# Patient Record
Sex: FEMALE | Race: WHITE | NOT HISPANIC OR LATINO | URBAN - METROPOLITAN AREA
[De-identification: names, ages, dates, MRNs, and addresses within clinical notes are randomized per-mention and may not be internally consistent; named-entity substitution may affect disease eponyms.]

---

## 2019-04-22 ENCOUNTER — OUTPATIENT (OUTPATIENT)
Dept: OUTPATIENT SERVICES | Facility: HOSPITAL | Age: 79
LOS: 1 days | End: 2019-04-22
Payer: MEDICARE

## 2019-04-22 VITALS
WEIGHT: 143.08 LBS | OXYGEN SATURATION: 98 % | DIASTOLIC BLOOD PRESSURE: 71 MMHG | SYSTOLIC BLOOD PRESSURE: 128 MMHG | RESPIRATION RATE: 14 BRPM | HEIGHT: 63 IN | TEMPERATURE: 98 F | HEART RATE: 70 BPM

## 2019-04-22 DIAGNOSIS — I80.00 PHLEBITIS AND THROMBOPHLEBITIS OF SUPERFICIAL VESSELS OF UNSPECIFIED LOWER EXTREMITY: ICD-10-CM

## 2019-04-22 DIAGNOSIS — Z01.818 ENCOUNTER FOR OTHER PREPROCEDURAL EXAMINATION: ICD-10-CM

## 2019-04-22 DIAGNOSIS — Z90.49 ACQUIRED ABSENCE OF OTHER SPECIFIED PARTS OF DIGESTIVE TRACT: Chronic | ICD-10-CM

## 2019-04-22 DIAGNOSIS — Z95.828 PRESENCE OF OTHER VASCULAR IMPLANTS AND GRAFTS: Chronic | ICD-10-CM

## 2019-04-22 DIAGNOSIS — Z98.890 OTHER SPECIFIED POSTPROCEDURAL STATES: Chronic | ICD-10-CM

## 2019-04-22 DIAGNOSIS — Z96.649 PRESENCE OF UNSPECIFIED ARTIFICIAL HIP JOINT: Chronic | ICD-10-CM

## 2019-04-22 DIAGNOSIS — S62.607P: ICD-10-CM

## 2019-04-22 LAB
ALBUMIN SERPL ELPH-MCNC: 3.8 G/DL — SIGNIFICANT CHANGE UP (ref 3.3–5)
ALP SERPL-CCNC: 85 U/L — SIGNIFICANT CHANGE UP (ref 40–120)
ALT FLD-CCNC: 21 U/L — SIGNIFICANT CHANGE UP (ref 12–78)
ANION GAP SERPL CALC-SCNC: 6 MMOL/L — SIGNIFICANT CHANGE UP (ref 5–17)
AST SERPL-CCNC: 13 U/L — LOW (ref 15–37)
BILIRUB SERPL-MCNC: 0.3 MG/DL — SIGNIFICANT CHANGE UP (ref 0.2–1.2)
BUN SERPL-MCNC: 30 MG/DL — HIGH (ref 7–23)
CALCIUM SERPL-MCNC: 9.2 MG/DL — SIGNIFICANT CHANGE UP (ref 8.5–10.1)
CHLORIDE SERPL-SCNC: 109 MMOL/L — HIGH (ref 96–108)
CO2 SERPL-SCNC: 29 MMOL/L — SIGNIFICANT CHANGE UP (ref 22–31)
CREAT SERPL-MCNC: 0.99 MG/DL — SIGNIFICANT CHANGE UP (ref 0.5–1.3)
GLUCOSE SERPL-MCNC: 87 MG/DL — SIGNIFICANT CHANGE UP (ref 70–99)
HCT VFR BLD CALC: 37.6 % — SIGNIFICANT CHANGE UP (ref 34.5–45)
HGB BLD-MCNC: 11.9 G/DL — SIGNIFICANT CHANGE UP (ref 11.5–15.5)
MCHC RBC-ENTMCNC: 30 PG — SIGNIFICANT CHANGE UP (ref 27–34)
MCHC RBC-ENTMCNC: 31.6 GM/DL — LOW (ref 32–36)
MCV RBC AUTO: 94.7 FL — SIGNIFICANT CHANGE UP (ref 80–100)
NRBC # BLD: 0 /100 WBCS — SIGNIFICANT CHANGE UP (ref 0–0)
PLATELET # BLD AUTO: 210 K/UL — SIGNIFICANT CHANGE UP (ref 150–400)
POTASSIUM SERPL-MCNC: 4.6 MMOL/L — SIGNIFICANT CHANGE UP (ref 3.5–5.3)
POTASSIUM SERPL-SCNC: 4.6 MMOL/L — SIGNIFICANT CHANGE UP (ref 3.5–5.3)
PROT SERPL-MCNC: 6.5 G/DL — SIGNIFICANT CHANGE UP (ref 6–8.3)
RBC # BLD: 3.97 M/UL — SIGNIFICANT CHANGE UP (ref 3.8–5.2)
RBC # FLD: 12.5 % — SIGNIFICANT CHANGE UP (ref 10.3–14.5)
SODIUM SERPL-SCNC: 144 MMOL/L — SIGNIFICANT CHANGE UP (ref 135–145)
WBC # BLD: 5.91 K/UL — SIGNIFICANT CHANGE UP (ref 3.8–10.5)
WBC # FLD AUTO: 5.91 K/UL — SIGNIFICANT CHANGE UP (ref 3.8–10.5)

## 2019-04-22 PROCEDURE — 86900 BLOOD TYPING SEROLOGIC ABO: CPT

## 2019-04-22 PROCEDURE — 36415 COLL VENOUS BLD VENIPUNCTURE: CPT

## 2019-04-22 PROCEDURE — G0463: CPT

## 2019-04-22 PROCEDURE — 93010 ELECTROCARDIOGRAM REPORT: CPT | Mod: NC

## 2019-04-22 PROCEDURE — 85027 COMPLETE CBC AUTOMATED: CPT

## 2019-04-22 PROCEDURE — 80053 COMPREHEN METABOLIC PANEL: CPT

## 2019-04-22 PROCEDURE — 93005 ELECTROCARDIOGRAM TRACING: CPT

## 2019-04-22 PROCEDURE — 86850 RBC ANTIBODY SCREEN: CPT

## 2019-04-22 PROCEDURE — 86901 BLOOD TYPING SEROLOGIC RH(D): CPT

## 2019-04-22 NOTE — H&P PST ADULT - HISTORY OF PRESENT ILLNESS
78 year old female PMH Irregular Rhythm, Palpitations, Lyme Disease, Arthritis presents for PST prior to Closed Reduction Percutaneous Pinning of the LEFT Small Finger/Possible Open Reduction Internal Fixation with Application of Splint with Dr Brower on 4/24/19 - Pt notes " I Thought I broke my LEFT Hand on Wed April 17th - and I went to the ER at [facility] and X-rays and the doctor did some manipulation as my pinky finger was just sticking out." Pt notes "then he bandged me up and sent me to Dr Coy." Pt notes she was seen in the office Friday 4/19/19 and more Xrays were obtained and FX of LEFT little finger was diagnosed - pt notes "she wrapped/put me in a splint and we discussed surgery to repair fracture." Pt notes "worst pain is at night rates it #8/10 on pain scale - using Oxycodone for pain as needed with some relief noted - following discussions with Dr Coy pt is electing for scheduled procedure. 78 year old female PMH Irregular Rhythm, Palpitations, Lyme Disease, Arthritis presents for PST prior to Closed Reduction Percutaneous Pinning of the LEFT Small Finger/Possible Open Reduction Internal Fixation with Application of Splint with Dr Brower on 4/24/19 - Pt notes " I Thought I broke my LEFT Hand on Wed April 17th -I was home baking and someone knocked on my door - I turned around to rush to the door slammed my finger into the wall while rushing." Pt states she  went to the ER at [facility] and had  X-rays - notes the doctor did some manipulation as my pinky finger was just sticking out." Pt notes "then he bandged me up and sent me to Dr Coy." Pt notes she was seen in the office Friday 4/19/19 and more Xrays were obtained and FX of LEFT little finger was diagnosed - pt notes "she wrapped/put me in a splint and we discussed surgery to repair fracture." Pt notes "worst pain is at night rates it #8/10 on pain scale - using Oxycodone for pain as needed with some relief noted - following discussions with Dr Coy pt is electing for scheduled procedure.

## 2019-04-22 NOTE — H&P PST ADULT - LAST STRESS TEST
Stress Test - September 2018 - Dr Massey - Cardiologist Stress Test - September 2018 - Dr Massey - Cardiologist- Sees Dr Richard (Placement and management of Loop Recorder- Placed Sept 2018)

## 2019-04-22 NOTE — H&P PST ADULT - NSICDXPASTMEDICALHX_GEN_ALL_CORE_FT
PAST MEDICAL HISTORY:  Anxiety and depression     Arthritis     Back pain     DVT of leg (deep venous thrombosis)     Fracture of unspecified phalanx of left little finger, subsequent encounter for fracture with malunion     H/O degenerative disc disease     H/O dizziness     H/O heart artery stent LAD - September 2018    H/O heartburn     H/o Lyme disease     H/O nausea     H/O weakness     Headache     History of fainting spells of unknown cause     History of palpitations     Irregular heart rhythm     Morning headache     Muscle cramps     Occasional tremors Notes on RIGHT Side Since having Lyme Disease

## 2019-04-22 NOTE — H&P PST ADULT - ASSESSMENT
78 year old female with Fracture of Unspecified Phalanx of LEFT little Finger, Subsequent Encounter for fracture with Malunion with Dr Gissell Coy on 4/24/19

## 2019-04-22 NOTE — H&P PST ADULT - MUSCULOSKELETAL COMMENTS
LEFT Hand in Ace Wrap with Splint - fingers warm and mobile SEE HPI - LEFT Hand -Pinky in Splint with Ace Wrap- fingers mobile

## 2019-04-22 NOTE — H&P PST ADULT - NSANTHOSAYNRD_GEN_A_CORE
No. PREET screening performed.  STOP BANG Legend: 0-2 = LOW Risk; 3-4 = INTERMEDIATE Risk; 5-8 = HIGH Risk

## 2019-04-22 NOTE — H&P PST ADULT - NSICDXFAMILYHX_GEN_ALL_CORE_FT
FAMILY HISTORY:  Family history of stroke, Father -  age 49 - Pt notes Mother Still alive and well age 105

## 2019-04-22 NOTE — H&P PST ADULT - NSICDXPASTSURGICALHX_GEN_ALL_CORE_FT
PAST SURGICAL HISTORY:  [place] filter in place     H/O colonoscopy     S/P appendectomy Also had RIGHT Ovary Removed at this time    S/P cholecystectomy     S/P hip replacement Bilateral    S/P partial colectomy

## 2019-04-22 NOTE — H&P PST ADULT - NSICDXPROBLEM_GEN_ALL_CORE_FT
PROBLEM DIAGNOSES  Problem: Fracture of unspecified phalanx of left little finger, subsequent encounter for fracture with malunion  Assessment and Plan: PST Labs; CBC< CMP, Type & Screen, EKG - Medical Clearance appointment scheduled with Dr Murrell today at 3 Pm - pt to make cardiology clearance appointment PROBLEM DIAGNOSES  Problem: Fracture of unspecified phalanx of left little finger, subsequent encounter for fracture with malunion    Assessment and Plan: PST Labs; CBC, CMP, Type & Screen, EKG - Medical Clearance appointment scheduled with Dr Yoder today at 3 Pm - pt to make cardiology clearance appointment (Attempted several times to call cardiology Office however phone lines down at office - pt to call when she arrives home to get appointment as procedure is on Wed 4/24/19)    Pt instructed to stop Multivitamin as well as any NSAIDS/herbal Supplements between now and procedure - she may take Tylenol as needed for pain  - she was instructed by Dr Coy to Continue Both her Baby ASA as well as her Plavix secondary to newly placed ANTOINE (September 2018) - I spoke with Anesthesia (Dr Medrano) regarding this and he was ok with pt remaining on both Plavix and ASA as well at this time -  Pt instructed to take Baby ASA, Plavix, Lipitor, Pindolol and Effexor with small sip of water morning of procedure    Pre-Procedure instructions as well as pre-op wash instructions (one surgical scrub only - due to Splint/Ace Wrap ) given to pt with understanding verbalized    Pt verbalizes understanding of all instructions and that she will need cardiac clearance prior to procedure - PROBLEM DIAGNOSES  Problem: Fracture of unspecified phalanx of left little finger, subsequent encounter for fracture with malunion    Assessment and Plan: PST Labs; CBC, CMP, Type & Screen, EKG - Medical Clearance appointment scheduled with Dr Yoder today at 3 Pm - pt to make cardiology clearance appointment (Attempted several times to call cardiology Office however phone lines down at office - pt to call when she arrives home to get appointment as procedure is on Wed 4/24/19)- I spoke with cardiology office pt has appointment scheduled for tomorrow 4/23/19 @ 10AM (Dr Massey)- all PST's faxed to offices    Pt instructed to stop Multivitamin as well as any NSAIDS/herbal Supplements between now and procedure - she may take Tylenol as needed for pain  - she was instructed by Dr Coy to Continue Both her Baby ASA as well as her Plavix secondary to newly placed ANTOINE (September 2018) - I spoke with Anesthesia (Dr Medrano) regarding this and he was ok with pt remaining on both Plavix and ASA as well at this time -  Pt instructed to take Baby ASA, Plavix, Lipitor, Pindolol and Effexor with small sip of water morning of procedure    Pre-Procedure instructions as well as pre-op wash instructions (one surgical scrub only - due to Splint/Ace Wrap ) given to pt with understanding verbalized    Pt verbalizes understanding of all instructions and that she will need cardiac clearance prior to procedure -

## 2019-04-22 NOTE — H&P PST ADULT - ENMT COMMENTS
No fainting spells since been on Pindolol - also LOOP recorded in place Sept 2018 Patient claims - "No fainting spells since been on Pindolol" - also notes LOOP recorded in place Sept 2018

## 2019-04-23 ENCOUNTER — TRANSCRIPTION ENCOUNTER (OUTPATIENT)
Age: 79
End: 2019-04-23

## 2019-04-23 PROBLEM — Z95.5 PRESENCE OF CORONARY ANGIOPLASTY IMPLANT AND GRAFT: Chronic | Status: ACTIVE | Noted: 2019-04-22

## 2019-04-23 PROBLEM — R25.1 TREMOR, UNSPECIFIED: Chronic | Status: ACTIVE | Noted: 2019-04-22

## 2019-04-24 ENCOUNTER — OUTPATIENT (OUTPATIENT)
Dept: OUTPATIENT SERVICES | Facility: HOSPITAL | Age: 79
LOS: 1 days | End: 2019-04-24
Payer: MEDICARE

## 2019-04-24 VITALS
DIASTOLIC BLOOD PRESSURE: 76 MMHG | RESPIRATION RATE: 14 BRPM | OXYGEN SATURATION: 98 % | SYSTOLIC BLOOD PRESSURE: 159 MMHG | HEART RATE: 74 BPM | TEMPERATURE: 98 F

## 2019-04-24 VITALS
TEMPERATURE: 98 F | WEIGHT: 143.08 LBS | HEIGHT: 63 IN | SYSTOLIC BLOOD PRESSURE: 134 MMHG | HEART RATE: 73 BPM | DIASTOLIC BLOOD PRESSURE: 82 MMHG | RESPIRATION RATE: 16 BRPM | OXYGEN SATURATION: 97 %

## 2019-04-24 DIAGNOSIS — Z98.890 OTHER SPECIFIED POSTPROCEDURAL STATES: Chronic | ICD-10-CM

## 2019-04-24 DIAGNOSIS — Z90.49 ACQUIRED ABSENCE OF OTHER SPECIFIED PARTS OF DIGESTIVE TRACT: Chronic | ICD-10-CM

## 2019-04-24 DIAGNOSIS — Z96.649 PRESENCE OF UNSPECIFIED ARTIFICIAL HIP JOINT: Chronic | ICD-10-CM

## 2019-04-24 DIAGNOSIS — Z95.828 PRESENCE OF OTHER VASCULAR IMPLANTS AND GRAFTS: Chronic | ICD-10-CM

## 2019-04-24 DIAGNOSIS — S62.607P: ICD-10-CM

## 2019-04-24 PROCEDURE — C1713: CPT

## 2019-04-24 PROCEDURE — 26727 TREAT FINGER FRACTURE EACH: CPT | Mod: F4

## 2019-04-24 PROCEDURE — 76000 FLUOROSCOPY <1 HR PHYS/QHP: CPT

## 2019-04-24 RX ORDER — CEPHALEXIN 500 MG
1 CAPSULE ORAL
Qty: 10 | Refills: 0 | OUTPATIENT
Start: 2019-04-24 | End: 2019-04-28

## 2019-04-24 RX ORDER — ACETAMINOPHEN 500 MG
2 TABLET ORAL
Qty: 0 | Refills: 0 | COMMUNITY

## 2019-04-24 RX ORDER — VENLAFAXINE HCL 75 MG
300 CAPSULE, EXT RELEASE 24 HR ORAL
Qty: 0 | Refills: 0 | COMMUNITY

## 2019-04-24 RX ORDER — SODIUM CHLORIDE 9 MG/ML
1000 INJECTION, SOLUTION INTRAVENOUS
Qty: 0 | Refills: 0 | Status: DISCONTINUED | OUTPATIENT
Start: 2019-04-24 | End: 2019-04-24

## 2019-04-24 RX ORDER — PINDOLOL 10 MG
7.5 TABLET ORAL
Qty: 0 | Refills: 0 | COMMUNITY

## 2019-04-24 RX ORDER — ASCORBIC ACID 60 MG
1 TABLET,CHEWABLE ORAL
Qty: 0 | Refills: 0 | COMMUNITY

## 2019-04-24 RX ORDER — CLONAZEPAM 1 MG
1 TABLET ORAL
Qty: 0 | Refills: 0 | COMMUNITY

## 2019-04-24 RX ORDER — BUTALBITAL/ACETAMINOPHEN 50MG-650MG
1 TABLET ORAL
Qty: 0 | Refills: 0 | COMMUNITY

## 2019-04-24 RX ORDER — ONDANSETRON 8 MG/1
4 TABLET, FILM COATED ORAL ONCE
Qty: 0 | Refills: 0 | Status: DISCONTINUED | OUTPATIENT
Start: 2019-04-24 | End: 2019-04-24

## 2019-04-24 RX ORDER — CLONAZEPAM 1 MG
1 TABLET ORAL
Qty: 0 | Refills: 0 | COMMUNITY
Start: 2019-04-24

## 2019-04-24 RX ORDER — FENTANYL CITRATE 50 UG/ML
25 INJECTION INTRAVENOUS
Qty: 0 | Refills: 0 | Status: DISCONTINUED | OUTPATIENT
Start: 2019-04-24 | End: 2019-04-24

## 2019-04-24 RX ORDER — ATORVASTATIN CALCIUM 80 MG/1
1 TABLET, FILM COATED ORAL
Qty: 0 | Refills: 0 | COMMUNITY

## 2019-04-24 RX ORDER — CLOPIDOGREL BISULFATE 75 MG/1
1 TABLET, FILM COATED ORAL
Qty: 0 | Refills: 0 | COMMUNITY

## 2019-04-24 RX ORDER — ASPIRIN/CALCIUM CARB/MAGNESIUM 324 MG
343 TABLET ORAL
Qty: 0 | Refills: 0 | COMMUNITY

## 2019-04-24 RX ORDER — CEFAZOLIN SODIUM 1 G
2000 VIAL (EA) INJECTION ONCE
Qty: 0 | Refills: 0 | Status: COMPLETED | OUTPATIENT
Start: 2019-04-24 | End: 2019-04-24

## 2019-04-24 RX ADMIN — SODIUM CHLORIDE 40 MILLILITER(S): 9 INJECTION, SOLUTION INTRAVENOUS at 13:14

## 2019-04-24 NOTE — ASU PATIENT PROFILE, ADULT - PATIENT'S HEIGHT AND WEIGHT RECORDED IN THE VITAL SIGNS FLOWSHEET
yes Trilobed Flap Text: The defect edges were debeveled with a #15 scalpel blade.  Given the location of the defect and the proximity to free margins a trilobed flap was deemed most appropriate.  Using a sterile surgical marker, an appropriate trilobed flap drawn around the defect.    The area thus outlined was incised deep to adipose tissue with a #15 scalpel blade.  The skin margins were undermined to an appropriate distance in all directions utilizing iris scissors.

## 2019-04-24 NOTE — ASU PATIENT PROFILE, ADULT - PMH
Anxiety and depression    Arthritis    Back pain    DVT of leg (deep venous thrombosis)    Fracture of unspecified phalanx of left little finger, subsequent encounter for fracture with malunion    H/O degenerative disc disease    H/O dizziness    H/O heart artery stent  LAD - September 2018  H/O heartburn    H/o Lyme disease    H/O nausea    H/O weakness    Headache    History of fainting spells of unknown cause    History of palpitations    Irregular heart rhythm    Morning headache    Muscle cramps    Occasional tremors  Notes on RIGHT Side Since having Lyme Disease

## 2019-04-24 NOTE — ASU DISCHARGE PLAN (ADULT/PEDIATRIC) - CALL YOUR DOCTOR IF YOU HAVE ANY OF THE FOLLOWING:
Wound/Surgical Site with redness, or foul smelling discharge or pus/Pain not relieved by Medications Bleeding that does not stop/Numbness, tingling, color or temperature change to extremity/Pain not relieved by Medications/Wound/Surgical Site with redness, or foul smelling discharge or pus

## 2019-04-24 NOTE — ASU DISCHARGE PLAN (ADULT/PEDIATRIC) - CARE PROVIDER_API CALL
Gissell Coy)  Plastic Surgery; Surgery of the Hand  [address] [place] [address]  Phone: [phone]  Fax: [phone]  Follow Up Time:

## 2019-04-24 NOTE — ASU PATIENT PROFILE, ADULT - PSH
[place] filter in place    H/O colonoscopy    S/P appendectomy  Also had RIGHT Ovary Removed at this time  S/P cholecystectomy    S/P hip replacement  Bilateral  S/P partial colectomy

## 2019-04-24 NOTE — ASU DISCHARGE PLAN (ADULT/PEDIATRIC) - ASU DC SPECIAL INSTRUCTIONSFT
Non weight bearing with left hand  Keep splint clean dry and intact until seen in office  Elevate above heart to help reduce swelling  Patient has pain medication at home, none were sent from the EMR here  Follow up in office in 1-2 weeks   call office for appointment

## 2020-02-16 NOTE — H&P PST ADULT - POSTERIOR CERVICAL R
Referring Provider:       Trudy Sorenson MD     Additional Provider:     DO Alexy Carpenter MD    Reason for Referral:  Tuan Diaz had genetic testing performed on 1/31/2020 because she is adopted. normal

## 2021-04-08 ENCOUNTER — NON-APPOINTMENT (OUTPATIENT)
Age: 81
End: 2021-04-08

## 2021-04-08 DIAGNOSIS — Z86.59 PERSONAL HISTORY OF OTHER MENTAL AND BEHAVIORAL DISORDERS: ICD-10-CM

## 2021-04-08 DIAGNOSIS — J44.9 CHRONIC OBSTRUCTIVE PULMONARY DISEASE, UNSPECIFIED: ICD-10-CM

## 2021-04-08 DIAGNOSIS — Z78.9 OTHER SPECIFIED HEALTH STATUS: ICD-10-CM

## 2021-04-08 DIAGNOSIS — G43.909 MIGRAINE, UNSPECIFIED, NOT INTRACTABLE, W/OUT STATUS MIGRAINOSUS: ICD-10-CM

## 2021-04-08 DIAGNOSIS — E78.5 HYPERLIPIDEMIA, UNSPECIFIED: ICD-10-CM

## 2022-08-09 PROBLEM — Z87.898 PERSONAL HISTORY OF OTHER SPECIFIED CONDITIONS: Chronic | Status: ACTIVE | Noted: 2019-04-22

## 2022-08-09 PROBLEM — I82.409 ACUTE EMBOLISM AND THROMBOSIS OF UNSPECIFIED DEEP VEINS OF UNSPECIFIED LOWER EXTREMITY: Chronic | Status: ACTIVE | Noted: 2019-04-22

## 2022-08-09 PROBLEM — Z86.19 PERSONAL HISTORY OF OTHER INFECTIOUS AND PARASITIC DISEASES: Chronic | Status: ACTIVE | Noted: 2019-04-22

## 2022-08-09 PROBLEM — M19.90 UNSPECIFIED OSTEOARTHRITIS, UNSPECIFIED SITE: Chronic | Status: ACTIVE | Noted: 2019-04-22

## 2022-08-09 PROBLEM — R51 HEADACHE: Chronic | Status: ACTIVE | Noted: 2019-04-22

## 2022-08-09 PROBLEM — M54.9 DORSALGIA, UNSPECIFIED: Chronic | Status: ACTIVE | Noted: 2019-04-22

## 2022-08-09 PROBLEM — I49.9 CARDIAC ARRHYTHMIA, UNSPECIFIED: Chronic | Status: ACTIVE | Noted: 2019-04-22

## 2022-08-09 PROBLEM — R25.2 CRAMP AND SPASM: Chronic | Status: ACTIVE | Noted: 2019-04-22

## 2022-08-09 PROBLEM — Z87.39 PERSONAL HISTORY OF OTHER DISEASES OF THE MUSCULOSKELETAL SYSTEM AND CONNECTIVE TISSUE: Chronic | Status: ACTIVE | Noted: 2019-04-22

## 2022-08-09 PROBLEM — Z91.89 OTHER SPECIFIED PERSONAL RISK FACTORS, NOT ELSEWHERE CLASSIFIED: Chronic | Status: ACTIVE | Noted: 2019-04-22

## 2022-08-09 PROBLEM — F41.9 ANXIETY DISORDER, UNSPECIFIED: Chronic | Status: ACTIVE | Noted: 2019-04-22

## 2022-08-09 PROBLEM — S62.607P: Chronic | Status: ACTIVE | Noted: 2019-04-22

## 2022-08-29 ENCOUNTER — APPOINTMENT (OUTPATIENT)
Dept: GASTROENTEROLOGY | Facility: CLINIC | Age: 82
End: 2022-08-29

## 2022-08-29 VITALS
OXYGEN SATURATION: 97 % | DIASTOLIC BLOOD PRESSURE: 60 MMHG | SYSTOLIC BLOOD PRESSURE: 110 MMHG | HEIGHT: 62 IN | TEMPERATURE: 96.5 F | WEIGHT: 140.25 LBS | BODY MASS INDEX: 25.81 KG/M2 | HEART RATE: 76 BPM

## 2022-08-29 DIAGNOSIS — K38.8 OTHER SPECIFIED DISEASES OF APPENDIX: ICD-10-CM

## 2022-08-29 DIAGNOSIS — K59.1 FUNCTIONAL DIARRHEA: ICD-10-CM

## 2022-08-29 DIAGNOSIS — Z87.19 PERSONAL HISTORY OF OTHER DISEASES OF THE DIGESTIVE SYSTEM: ICD-10-CM

## 2022-08-29 DIAGNOSIS — K35.32 ACUTE APPENDICITIS W/ PERFORATION AND LOCALIZED PERITONITIS, W/O ABSCESS: ICD-10-CM

## 2022-08-29 DIAGNOSIS — Z82.3 FAMILY HISTORY OF STROKE: ICD-10-CM

## 2022-08-29 DIAGNOSIS — Z86.718 PERSONAL HISTORY OF OTHER VENOUS THROMBOSIS AND EMBOLISM: ICD-10-CM

## 2022-08-29 DIAGNOSIS — I25.10 ATHEROSCLEROTIC HEART DISEASE OF NATIVE CORONARY ARTERY W/OUT ANGINA PECTORIS: ICD-10-CM

## 2022-08-29 DIAGNOSIS — I10 ESSENTIAL (PRIMARY) HYPERTENSION: ICD-10-CM

## 2022-08-29 PROCEDURE — 99214 OFFICE O/P EST MOD 30 MIN: CPT

## 2022-08-29 RX ORDER — BUTALBITAL, ACETAMINOPHEN AND CAFFEINE 325; 50; 40 MG/1; MG/1; MG/1
50-325-40 TABLET ORAL
Qty: 60 | Refills: 0 | Status: ACTIVE | COMMUNITY
Start: 2022-06-02

## 2022-08-29 RX ORDER — PINDOLOL 5 MG/1
5 TABLET ORAL
Qty: 180 | Refills: 0 | Status: ACTIVE | COMMUNITY
Start: 2022-04-05

## 2022-08-29 RX ORDER — DESVENLAFAXINE 100 MG/1
100 TABLET, EXTENDED RELEASE ORAL
Qty: 30 | Refills: 0 | Status: ACTIVE | COMMUNITY
Start: 2022-03-30

## 2022-08-29 RX ORDER — UBROGEPANT 100 MG/1
100 TABLET ORAL
Qty: 10 | Refills: 0 | Status: ACTIVE | COMMUNITY
Start: 2022-03-02

## 2022-08-29 RX ORDER — BUSPIRONE HYDROCHLORIDE 30 MG/1
30 TABLET ORAL
Qty: 60 | Refills: 0 | Status: ACTIVE | COMMUNITY
Start: 2022-04-12

## 2022-08-29 RX ORDER — DESVENLAFAXINE 50 MG/1
50 TABLET, EXTENDED RELEASE ORAL
Qty: 30 | Refills: 0 | Status: ACTIVE | COMMUNITY
Start: 2022-04-12

## 2022-08-29 RX ORDER — APIXABAN 5 MG/1
5 TABLET, FILM COATED ORAL
Qty: 180 | Refills: 0 | Status: ACTIVE | COMMUNITY
Start: 2022-03-01

## 2022-08-29 RX ORDER — MIDODRINE HYDROCHLORIDE 5 MG/1
5 TABLET ORAL
Qty: 90 | Refills: 0 | Status: ACTIVE | COMMUNITY
Start: 2022-04-05

## 2022-08-29 RX ORDER — AZITHROMYCIN 250 MG/1
250 TABLET, FILM COATED ORAL
Qty: 6 | Refills: 0 | Status: ACTIVE | COMMUNITY
Start: 2022-05-30

## 2022-08-29 RX ORDER — COVID-19 ANTIGEN TEST
KIT MISCELLANEOUS
Qty: 8 | Refills: 0 | Status: ACTIVE | COMMUNITY
Start: 2022-03-23

## 2022-08-29 RX ORDER — CLONAZEPAM 0.5 MG/1
0.5 TABLET ORAL
Qty: 90 | Refills: 0 | Status: ACTIVE | COMMUNITY
Start: 2022-06-14

## 2022-08-29 RX ORDER — BUTALBITAL, ACETAMINOPHEN AND CAFFEINE 300; 50; 40 MG/1; MG/1; MG/1
50-300-40 CAPSULE ORAL
Qty: 30 | Refills: 0 | Status: ACTIVE | COMMUNITY
Start: 2021-12-10

## 2022-08-29 RX ORDER — TOPIRAMATE 25 MG/1
25 TABLET, FILM COATED ORAL
Qty: 120 | Refills: 0 | Status: ACTIVE | COMMUNITY
Start: 2022-06-28

## 2022-08-29 RX ORDER — OLANZAPINE 2.5 MG/1
2.5 TABLET, FILM COATED ORAL
Qty: 90 | Refills: 0 | Status: ACTIVE | COMMUNITY
Start: 2022-08-23

## 2022-08-29 NOTE — ASSESSMENT
[FreeTextEntry1] : Impression: Exacerbation of GERD off PPI.  History of colon polyps due for follow-up colonoscopy 2024.  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  No evidence of pseudotumor peritonei since that time.  Status post sigmoid resection for diverticulitis.  Chronic diarrhea stable.  Status post DVT on Eliquis.\par \par Plan: Begin pantoprazole 40 mg p.o. daily for 8 weeks then as needed.  Recall for colonoscopy 2024.

## 2022-08-29 NOTE — HISTORY OF PRESENT ILLNESS
[Heartburn] : heartburn worsened [Nausea] : nausea worsened [Vomiting] : vomiting worsened [Diarrhea] : stable diarrhea [Constipation] : denies constipation [Yellow Skin Or Eyes (Jaundice)] : denies jaundice [Abdominal Pain] : abdominal pain worsened [Abdominal Swelling] : denies abdominal swelling [Rectal Pain] : denies rectal pain [GERD] : gastroesophageal reflux disease [Hiatus Hernia] : no hiatus hernia [Peptic Ulcer Disease] : no peptic ulcer disease [Pancreatitis] : no pancreatitis [Cholelithiasis] : cholelithiasis [Kidney Stone] : no kidney stone [Inflammatory Bowel Disease] : no inflammatory bowel disease [Irritable Bowel Syndrome] : irritable bowel syndrome [Diverticulitis] : diverticulitis [Alcohol Abuse] : no alcohol abuse [Abdominal Surgery] : abdominal surgery [Appendectomy] : appendectomy [Cholecystectomy] : cholecystectomy [_________] : Performed [unfilled] [de-identified] : 82-year-old female history of coronary artery disease and cardiac stents and DVT currently on Eliquis, 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  Denies dysphagia.  Last EGD and colonoscopy while hospitalized in 2019 for exacerbation of diarrhea.  EGD was normal including random biopsies.  Colonoscopy was normal with the exception of postsurgical changes including random biopsies.  Secretory diarrhea work-up negative at that time.  Patient has no lower GI complaints currently.  On PPI in the past but has not taken in some time. [de-identified] : Normal [de-identified] : Status post right and sigmoid colon resections.  Random biopsies normal.  5-year recall.

## 2022-08-29 NOTE — PHYSICAL EXAM
[General Appearance - Alert] : alert [General Appearance - In No Acute Distress] : in no acute distress [Auscultation Breath Sounds / Voice Sounds] : lungs were clear to auscultation bilaterally [Heart Rate And Rhythm] : heart rate was normal and rhythm regular [Heart Sounds] : normal S1 and S2 [Heart Sounds Gallop] : no gallops [Murmurs] : no murmurs [Heart Sounds Pericardial Friction Rub] : no pericardial rub [Full Pulse] : the pedal pulses are present [Edema] : there was no peripheral edema [Bowel Sounds] : normal bowel sounds [Abdomen Soft] : soft [Abdomen Tenderness] : non-tender [] : no hepato-splenomegaly [Abdomen Mass (___ Cm)] : no abdominal mass palpated [FreeTextEntry1] : No healed surgical scars

## 2022-12-07 ENCOUNTER — NON-APPOINTMENT (OUTPATIENT)
Age: 82
End: 2022-12-07

## 2023-04-26 ENCOUNTER — RX RENEWAL (OUTPATIENT)
Age: 83
End: 2023-04-26

## 2023-09-05 ENCOUNTER — APPOINTMENT (OUTPATIENT)
Dept: OTOLARYNGOLOGY | Facility: CLINIC | Age: 83
End: 2023-09-05
Payer: MEDICARE

## 2023-09-05 VITALS
HEART RATE: 79 BPM | BODY MASS INDEX: 27.6 KG/M2 | DIASTOLIC BLOOD PRESSURE: 81 MMHG | HEIGHT: 62 IN | SYSTOLIC BLOOD PRESSURE: 125 MMHG | WEIGHT: 150 LBS

## 2023-09-05 DIAGNOSIS — H61.20 IMPACTED CERUMEN, UNSPECIFIED EAR: ICD-10-CM

## 2023-09-05 PROCEDURE — G0268 REMOVAL OF IMPACTED WAX MD: CPT

## 2023-09-05 PROCEDURE — 99205 OFFICE O/P NEW HI 60 MIN: CPT | Mod: 25

## 2023-09-05 PROCEDURE — 92557 COMPREHENSIVE HEARING TEST: CPT

## 2023-09-05 PROCEDURE — 92567 TYMPANOMETRY: CPT

## 2023-09-05 NOTE — ASSESSMENT
[FreeTextEntry1] :  MILD TO MODERATE SNHL VNG BPPV PERCAUTIONS MECLIZINE PRN SEEMS UNCOMFORTABLE STAT ED EVALUATION ADVISED CALLED ED [facility]  PMD AND NEUROLOGY FOLLOW UP

## 2023-09-05 NOTE — PHYSICAL EXAM
[Normal] : mucosa is normal [Midline] : trachea located in midline position [de-identified] : DECLAN IMPACTED CERUMEN REMOVED

## 2023-09-05 NOTE — REVIEW OF SYSTEMS
[Ear Pain] : ear pain [Anxiety] : anxiety [Depression] : depression [Easy Bruising] : a tendency for easy bruising [Negative] : Heme/Lymph [Patient Intake Form Reviewed] : Patient intake form was reviewed [FreeTextEntry1] : Fatigue

## 2023-09-05 NOTE — HISTORY OF PRESENT ILLNESS
[de-identified] : REFERRED BY HER PMD POSITIONAL DIZZINESS FOR PAST FEW DAYS HOSPITALIZATION AT [facility] FOR FAINT AND DIZZINESS EVALUATION AS PER PATIENT ALL IMAGE STUDY WNL MEDICAL HX REVIEWED

## 2023-09-06 ENCOUNTER — APPOINTMENT (OUTPATIENT)
Dept: OTOLARYNGOLOGY | Facility: CLINIC | Age: 83
End: 2023-09-06
Payer: MEDICARE

## 2023-09-06 VITALS
DIASTOLIC BLOOD PRESSURE: 73 MMHG | WEIGHT: 150 LBS | HEIGHT: 62 IN | SYSTOLIC BLOOD PRESSURE: 110 MMHG | BODY MASS INDEX: 27.6 KG/M2 | HEART RATE: 80 BPM

## 2023-09-06 PROCEDURE — 99213 OFFICE O/P EST LOW 20 MIN: CPT

## 2023-09-06 NOTE — HISTORY OF PRESENT ILLNESS
[de-identified] : 83-year-old female with hx of CAD, COPD, Gastroesophageal reflux disease without esophagitis HLD, HTN. She was initially seen by Dr. Florencia Garcia for dizziness and cerumen impaction yesterday. She gets dizzy in either direction.

## 2023-09-06 NOTE — ASSESSMENT
[FreeTextEntry1] : Reviewed and reconciled medications, allergies, PMHx, PSHx, SocHx, FMHx.  83-year-old female with hx of CAD, COPD, Gastroesophageal reflux disease without esophagitis HLD, HTN. She was initially seen by Dr. Florencia Garcia for dizziness and cerumen impaction yesterday. She gets dizzy in either direction.  Allergy to Iodine, Sulfur, and Codeine  Audiology report 06/15/2023  Right: SRT 20, WRS 92%, 60 db/M Left: SRT 20, WRS 92%, 60 db/M  Physical Exam: Negative Nystagmus Positive horizontal and vertical head roll, better with eyes open Negative Romberg but very unsteady  Plan: Vestibular function test.  VNG

## 2023-09-06 NOTE — PHYSICAL EXAM
[Normal] : the left mastoid was normal [Rinne Test Air Conduction Persists > Bone Conduction Right] : air conduction greater than bone conduction on the right [Hearing Casey Test (Tuning Fork On Forehead)] : no lateralization of tone [Hearing Loss Right Only] : normal [Hearing Loss Left Only] : normal [Nystagmus] : ~T no ~M nystagmus was seen [Romberg's Sign] : Romberg's sign was absent

## 2023-10-16 ENCOUNTER — APPOINTMENT (OUTPATIENT)
Dept: OTOLARYNGOLOGY | Facility: CLINIC | Age: 83
End: 2023-10-16
Payer: MEDICARE

## 2023-10-16 PROCEDURE — 92540 BASIC VESTIBULAR EVALUATION: CPT

## 2023-10-16 PROCEDURE — 92537 CALORIC VSTBLR TEST W/REC: CPT

## 2023-10-31 ENCOUNTER — RESULT REVIEW (OUTPATIENT)
Age: 83
End: 2023-10-31

## 2023-10-31 ENCOUNTER — APPOINTMENT (OUTPATIENT)
Dept: OTOLARYNGOLOGY | Facility: CLINIC | Age: 83
End: 2023-10-31
Payer: MEDICARE

## 2023-10-31 VITALS
HEIGHT: 61 IN | BODY MASS INDEX: 26.43 KG/M2 | WEIGHT: 140 LBS | HEART RATE: 87 BPM | DIASTOLIC BLOOD PRESSURE: 73 MMHG | SYSTOLIC BLOOD PRESSURE: 120 MMHG

## 2023-10-31 DIAGNOSIS — H83.8X1 OTHER SPECIFIED DISEASES OF RIGHT INNER EAR: ICD-10-CM

## 2023-10-31 DIAGNOSIS — H70.12 CHRONIC MASTOIDITIS, LEFT EAR: ICD-10-CM

## 2023-10-31 DIAGNOSIS — R42 DIZZINESS AND GIDDINESS: ICD-10-CM

## 2023-10-31 DIAGNOSIS — R94.118 ABNORMAL RESULTS OF OTHER FUNCTION STUDIES OF EYE: ICD-10-CM

## 2023-10-31 DIAGNOSIS — H90.3 SENSORINEURAL HEARING LOSS, BILATERAL: ICD-10-CM

## 2023-10-31 PROCEDURE — 70480 CT ORBIT/EAR/FOSSA W/O DYE: CPT

## 2023-10-31 PROCEDURE — 99214 OFFICE O/P EST MOD 30 MIN: CPT

## 2024-01-25 RX ORDER — PANTOPRAZOLE 40 MG/1
40 TABLET, DELAYED RELEASE ORAL
Qty: 90 | Refills: 3 | Status: ACTIVE | COMMUNITY
Start: 2022-08-29 | End: 1900-01-01

## 2024-05-28 ENCOUNTER — APPOINTMENT (OUTPATIENT)
Dept: GASTROENTEROLOGY | Facility: CLINIC | Age: 84
End: 2024-05-28
Payer: MEDICARE

## 2024-05-28 VITALS
TEMPERATURE: 97.5 F | HEIGHT: 61 IN | WEIGHT: 148 LBS | OXYGEN SATURATION: 94 % | DIASTOLIC BLOOD PRESSURE: 62 MMHG | BODY MASS INDEX: 27.94 KG/M2 | HEART RATE: 93 BPM | SYSTOLIC BLOOD PRESSURE: 112 MMHG

## 2024-05-28 DIAGNOSIS — Z86.010 PERSONAL HISTORY OF COLONIC POLYPS: ICD-10-CM

## 2024-05-28 DIAGNOSIS — R19.7 DIARRHEA, UNSPECIFIED: ICD-10-CM

## 2024-05-28 DIAGNOSIS — K21.9 GASTRO-ESOPHAGEAL REFLUX DISEASE W/OUT ESOPHAGITIS: ICD-10-CM

## 2024-05-28 PROCEDURE — 99214 OFFICE O/P EST MOD 30 MIN: CPT

## 2024-05-28 RX ORDER — HYOSCYAMINE SULFATE 0.38 MG/1
0.38 TABLET, EXTENDED RELEASE ORAL
Qty: 60 | Refills: 5 | Status: ACTIVE | COMMUNITY
Start: 2024-05-28 | End: 1900-01-01

## 2024-05-28 NOTE — HISTORY OF PRESENT ILLNESS
[FreeTextEntry1] : GERD symptoms controlled on pantoprazole.  Patient has had a recurrence of diarrhea.  Chronic diarrhea in the past managed with antispasmodics and cholestyramine.  Was chronically constipated prior to 2 colon surgeries.  History of polyps last colonoscopy done with EGD in 2019.  No significant findings including on random biopsy.  Patient will be due for follow-up colonoscopy this year although no recent history of polyps, current comorbidities including CAD and DVT on anticoagulation.  Moving bowels only twice a day approximately but is loose and occasionally urgent.

## 2024-05-28 NOTE — ASSESSMENT
[FreeTextEntry1] : Impression: Recurrence of previously chronic diarrhea.  Probably functional.  Rule out infectious.  Plan: Send stool for PCR and C. difficile.  Send elastase.  Will begin hyoscyamine 0.375 mg every 12 hours.  Further management pending results/response.  Defer further screening colonoscopies given patient's age and comorbidities.

## 2024-05-30 LAB
CDIFF BY PCR: NOT DETECTED
GI PCR PANEL: NOT DETECTED

## 2024-06-03 LAB — PANCREATIC ELASTASE, FECAL: >800 CD:794062645

## 2024-09-16 ENCOUNTER — RX RENEWAL (OUTPATIENT)
Age: 84
End: 2024-09-16

## 2024-09-17 ENCOUNTER — NON-APPOINTMENT (OUTPATIENT)
Age: 84
End: 2024-09-17

## 2024-09-18 ENCOUNTER — APPOINTMENT (OUTPATIENT)
Dept: OTOLARYNGOLOGY | Facility: CLINIC | Age: 84
End: 2024-09-18
Payer: MEDICARE

## 2024-09-18 VITALS — HEIGHT: 61 IN | WEIGHT: 148 LBS | BODY MASS INDEX: 27.94 KG/M2

## 2024-09-18 DIAGNOSIS — H90.3 SENSORINEURAL HEARING LOSS, BILATERAL: ICD-10-CM

## 2024-09-18 DIAGNOSIS — H61.23 IMPACTED CERUMEN, BILATERAL: ICD-10-CM

## 2024-09-18 DIAGNOSIS — R42 DIZZINESS AND GIDDINESS: ICD-10-CM

## 2024-09-18 DIAGNOSIS — H93.13 TINNITUS, BILATERAL: ICD-10-CM

## 2024-09-18 DIAGNOSIS — H70.12 CHRONIC MASTOIDITIS, LEFT EAR: ICD-10-CM

## 2024-09-18 DIAGNOSIS — G43.809 OTHER MIGRAINE, NOT INTRACTABLE, W/OUT STATUS MIGRAINOSUS: ICD-10-CM

## 2024-09-18 PROCEDURE — 99214 OFFICE O/P EST MOD 30 MIN: CPT | Mod: 25

## 2024-09-18 PROCEDURE — 69210 REMOVE IMPACTED EAR WAX UNI: CPT

## 2024-09-18 NOTE — DATA REVIEWED
[de-identified] : Audio 9/2023: -symmetric bilateral SNHL  VNG 10/16/23: -central brain related tests abnormal -peripheral brain related tests normal [de-identified] : MRI Brain and IACs 10/28/23: -chronic hyper intense lesions in the left brain -moderate to severe small vessel disease -fluid in the left mastoid  CT IACs 10/31/23: -fluid in the left mastoid -bad TMJ changes

## 2024-09-18 NOTE — HISTORY OF PRESENT ILLNESS
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 She states that her dizziness is worse in the morning. She states that vestibular therapy makes her dizziness worse. She states that she takes migraine medication which improves her migraine symptoms, but it does not affect her dizziness. She states that she always has tinnitus.

## 2024-09-18 NOTE — ASSESSMENT
[FreeTextEntry1] : Reviewed and reconciled medications, allergies, PMHx, PSHx, SocHx, FMHx.  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 She states that her dizziness is worse in the morning. She states that vestibular therapy makes her dizziness worse. She states that she takes migraine medication which improves her migraine symptoms, but it does not affect her dizziness. She states that she always has tinnitus.  MRI Brain and IACs 10/28/23: -chronic hyper intense lesions in the left brain -moderate to severe small vessel disease -fluid in the left mastoid  CT IACs 10/31/23: -fluid in the left mastoid -bad TMJ changes  Audio 9/2023: -symmetric bilateral SNHL  VNG 10/16/23: -central brain related tests abnormal -peripheral brain related tests normal  Physical exam: -right ear canal: cerumen impaction removed via curettage -left ear canal: cerumen impaction removed via curettage, suction, and alligator forceps -mastoid nontender bilaterally  Plan: Discussed patient history and plan for treatment with Dr. Whitfield -See neuro ophthalmologist -Continue vestibular therapy -Ordered MRA/MRV -Greater than 50% of the interaction spent discussing results and treatment -FU with results from MRA/MRV

## 2024-09-18 NOTE — ADDENDUM
[FreeTextEntry1] :  Documented by Isael Westfall acting as scribe for Dr. Cardona on 09/18/2024. All Medical record entries made by the Scribe were at my, Dr. Cardona, direction and personally dictated by me on 09/18/2024 . I have reviewed the chart and agree that the record accurately reflects my personal performance of the history, physical exam, assessment and plan. I have also personally directed, reviewed, and agreed with the discharge instructions.

## 2024-09-18 NOTE — PHYSICAL EXAM
[Normal] : the left mastoid was normal [Hearing Loss Right Only] : normal [Hearing Loss Left Only] : normal [FreeTextEntry8] :  cerumen impaction removed via curettage [FreeTextEntry9] :  cerumen impaction removed via curettage, suction, and alligator forceps

## 2024-09-18 NOTE — CONSULT LETTER
[Dear  ___] : Dear  [unfilled], [Courtesy Letter:] : I had the pleasure of seeing your patient, [unfilled], in my office today. [Please see my note below.] : Please see my note below. [Consult Closing:] : Thank you very much for allowing me to participate in the care of this patient.  If you have any questions, please do not hesitate to contact me. [Sincerely,] : Sincerely, [FreeTextEntry3] :  Noel Cardona MD FACS

## 2024-09-25 ENCOUNTER — APPOINTMENT (OUTPATIENT)
Dept: MRI IMAGING | Facility: CLINIC | Age: 84
End: 2024-09-25

## 2024-09-25 ENCOUNTER — APPOINTMENT (OUTPATIENT)
Dept: RADIOLOGY | Facility: CLINIC | Age: 84
End: 2024-09-25
Payer: MEDICARE

## 2024-09-25 ENCOUNTER — RESULT REVIEW (OUTPATIENT)
Age: 84
End: 2024-09-25

## 2024-09-25 PROCEDURE — 71046 X-RAY EXAM CHEST 2 VIEWS: CPT

## 2024-09-25 PROCEDURE — 70544 MR ANGIOGRAPHY HEAD W/O DYE: CPT | Mod: MH

## 2025-05-30 ENCOUNTER — NON-APPOINTMENT (OUTPATIENT)
Age: 85
End: 2025-05-30

## 2025-05-30 ENCOUNTER — APPOINTMENT (OUTPATIENT)
Dept: OTOLARYNGOLOGY | Facility: CLINIC | Age: 85
End: 2025-05-30

## 2025-05-30 VITALS
BODY MASS INDEX: 27 KG/M2 | HEART RATE: 65 BPM | WEIGHT: 143 LBS | SYSTOLIC BLOOD PRESSURE: 121 MMHG | DIASTOLIC BLOOD PRESSURE: 76 MMHG | HEIGHT: 61 IN

## 2025-05-30 DIAGNOSIS — R42 DIZZINESS AND GIDDINESS: ICD-10-CM

## 2025-05-30 DIAGNOSIS — H61.20 IMPACTED CERUMEN, UNSPECIFIED EAR: ICD-10-CM

## 2025-05-30 PROCEDURE — 69210 REMOVE IMPACTED EAR WAX UNI: CPT

## 2025-05-30 RX ORDER — ATORVASTATIN CALCIUM 40 MG/1
40 TABLET, FILM COATED ORAL
Refills: 0 | Status: ACTIVE | COMMUNITY

## 2025-05-30 RX ORDER — GABAPENTIN 600 MG/1
600 TABLET, COATED ORAL
Refills: 0 | Status: ACTIVE | COMMUNITY

## 2025-05-30 RX ORDER — MIRTAZAPINE 30 MG/1
30 TABLET, ORALLY DISINTEGRATING ORAL
Refills: 0 | Status: ACTIVE | COMMUNITY

## 2025-05-30 RX ORDER — BUSPIRONE HYDROCHLORIDE 15 MG/1
15 TABLET ORAL
Refills: 0 | Status: ACTIVE | COMMUNITY

## 2025-05-30 RX ORDER — DESVENLAFAXINE SUCCINATE 100 MG/1
TABLET, FILM COATED, EXTENDED RELEASE ORAL
Refills: 0 | Status: ACTIVE | COMMUNITY

## 2025-05-30 RX ORDER — MIRABEGRON 50 MG/1
50 TABLET, EXTENDED RELEASE ORAL
Refills: 0 | Status: ACTIVE | COMMUNITY

## 2025-05-30 RX ORDER — GLYBURIDE 5 MG/1
5 TABLET ORAL
Refills: 0 | Status: ACTIVE | COMMUNITY

## 2025-05-30 RX ORDER — METOPROLOL TARTRATE 75 MG/1
TABLET, FILM COATED ORAL
Refills: 0 | Status: ACTIVE | COMMUNITY

## 2025-05-30 RX ORDER — PINDOLOL 5 MG/1
5 TABLET ORAL
Refills: 0 | Status: ACTIVE | COMMUNITY

## 2025-05-30 RX ORDER — ATOGEPANT 60 MG/1
60 TABLET ORAL
Refills: 0 | Status: ACTIVE | COMMUNITY

## 2025-05-30 RX ORDER — MECLIZINE HYDROCHLORIDE 12.5 MG/1
12.5 TABLET ORAL
Refills: 0 | Status: ACTIVE | COMMUNITY

## 2025-05-30 RX ORDER — VITAMIN K2 90 MCG
1000 CAPSULE ORAL
Refills: 0 | Status: ACTIVE | COMMUNITY

## 2025-05-30 RX ORDER — CARBIDOPA AND LEVODOPA 25; 100 MG/1; MG/1
25-100 TABLET ORAL
Refills: 0 | Status: ACTIVE | COMMUNITY

## 2025-09-10 ENCOUNTER — RX RENEWAL (OUTPATIENT)
Age: 85
End: 2025-09-10